# Patient Record
Sex: FEMALE | Race: WHITE | NOT HISPANIC OR LATINO
[De-identification: names, ages, dates, MRNs, and addresses within clinical notes are randomized per-mention and may not be internally consistent; named-entity substitution may affect disease eponyms.]

---

## 2021-10-27 PROBLEM — Z00.00 ENCOUNTER FOR PREVENTIVE HEALTH EXAMINATION: Status: ACTIVE | Noted: 2021-10-27

## 2021-10-29 PROBLEM — Z78.9 NEVER SMOKED CIGARETTES: Status: ACTIVE | Noted: 2021-10-29

## 2021-10-29 PROBLEM — Z86.39 HISTORY OF HYPOTHYROIDISM: Status: RESOLVED | Noted: 2021-10-29 | Resolved: 2021-10-29

## 2021-10-29 RX ORDER — ROSUVASTATIN CALCIUM 5 MG/1
5 TABLET, FILM COATED ORAL
Refills: 0 | Status: ACTIVE | COMMUNITY

## 2021-10-29 RX ORDER — MIRABEGRON 25 MG/1
25 TABLET, FILM COATED, EXTENDED RELEASE ORAL
Refills: 0 | Status: ACTIVE | COMMUNITY

## 2021-10-29 RX ORDER — LEVOTHYROXINE SODIUM 137 UG/1
TABLET ORAL
Refills: 0 | Status: ACTIVE | COMMUNITY

## 2021-11-02 ENCOUNTER — APPOINTMENT (OUTPATIENT)
Dept: RADIATION ONCOLOGY | Facility: CLINIC | Age: 71
End: 2021-11-02
Payer: MEDICARE

## 2021-11-02 VITALS
SYSTOLIC BLOOD PRESSURE: 131 MMHG | WEIGHT: 197 LBS | DIASTOLIC BLOOD PRESSURE: 72 MMHG | TEMPERATURE: 98 F | BODY MASS INDEX: 34.91 KG/M2 | RESPIRATION RATE: 16 BRPM | OXYGEN SATURATION: 97 % | HEIGHT: 63 IN | HEART RATE: 82 BPM

## 2021-11-02 DIAGNOSIS — Z80.0 FAMILY HISTORY OF MALIGNANT NEOPLASM OF DIGESTIVE ORGANS: ICD-10-CM

## 2021-11-02 DIAGNOSIS — Z78.9 OTHER SPECIFIED HEALTH STATUS: ICD-10-CM

## 2021-11-02 DIAGNOSIS — Z86.39 PERSONAL HISTORY OF OTHER ENDOCRINE, NUTRITIONAL AND METABOLIC DISEASE: ICD-10-CM

## 2021-11-02 PROCEDURE — 99204 OFFICE O/P NEW MOD 45 MIN: CPT | Mod: 25

## 2021-11-02 RX ORDER — METRONIDAZOLE 10 MG/G
GEL TOPICAL
Refills: 0 | Status: DISCONTINUED | COMMUNITY
End: 2021-11-02

## 2021-11-02 NOTE — REVIEW OF SYSTEMS
[Fatigue: Grade 1 - Fatigue relieved by rest] : Fatigue: Grade 1 - Fatigue relieved by rest [Breast Pain: Grade 0] : Breast Pain: Grade 0 [Insomnia: Grade 1 - Mild difficulty falling asleep, staying asleep or waking up early] : Insomnia: Grade 1 - Mild difficulty falling asleep, staying asleep or waking up early

## 2021-11-02 NOTE — PHYSICAL EXAM
[Breast Appearance] : normal in appearance [Symmetric] : breasts are symmetric [Breast Palpation Mass] : no palpable masses [Breast Abnormal Lactation (Galactorrhea)] : no nipple discharge [No UE Edema] : there is no upper extremity edema [Normal] : oriented to person, place and time, the affect was normal, the mood was normal and not anxious [de-identified] : Well-healed lumpectomy scar on left side with no underlying induration.  No other lumps palpable.  No lymph nodes palpable in left axilla

## 2021-11-02 NOTE — OB/GYN HISTORY
[History of Birth Control Pills] : Patient has a history of taking birth control pills [___] : Total Pregnancies: [unfilled] [History of Hormone Replacement Therapy] : no history of hormone replacement therapy

## 2021-11-16 ENCOUNTER — NON-APPOINTMENT (OUTPATIENT)
Age: 71
End: 2021-11-16

## 2021-11-16 VITALS
SYSTOLIC BLOOD PRESSURE: 97 MMHG | HEART RATE: 70 BPM | TEMPERATURE: 98.1 F | HEIGHT: 63 IN | RESPIRATION RATE: 16 BRPM | BODY MASS INDEX: 34.91 KG/M2 | WEIGHT: 197 LBS | DIASTOLIC BLOOD PRESSURE: 65 MMHG | OXYGEN SATURATION: 98 %

## 2021-11-16 NOTE — HISTORY OF PRESENT ILLNESS
[FreeTextEntry1] : Ms. Valerio is a 70 year old female referred here by Dr. Aguilar. \par \par Dr. Bernabe at [facility] palpated a mass on a regular GYN visit and ordered imaging. \par \par On 6/22/21 she had a bilateral mammogram and ultrasound at [facility] which revealed architectural distortion and vague shadowing in the left breast 2-3:00 region. \par \par On 7/14/21 she had an ultrasound guided biopsy at [facility] revealing invasive mammary carcinoma, with ductal and lobular features, ER 90%, ID 50%, HER-2 negative. \par \par On 7/30/21 she had an MRI at [facility] revealing spiculated enhancing left breast mass at 2:00, indeterminate enlarged left axillary lymph node. No suspicious enhancement in the right breast. \par \par On 9/1/21 she had a left lumpectomy and sentinel node biopsy, tumor site 2:00, histological  grade 6/9,  revealing an overall  grade 2/3, tumor size 20mm, invasive ductal carcinoma, margins negative, narrowest surgical margins- 3mm from posterior surface, 7mm from anterior surface, and 9mm from interior surface.  3 left axillary sentinel lymph nodes negative, no lymphovascular invasion present. ER99%, ID 50%, HER-2 zero, Ki-67 30%. \par \par On 9/23/21 Oncotype was 21.\par \par She met with Dr. Owens on 10/25/21 who discussed aromatase inhibitors post radiation, and a bone density test. \par \par She denies any pain, and is healing well post surgery. She report insomnia which is not new. \par \par 11/16/21\par She is here for an OTV, she has completed 1/16 fractions for a total of 265 cGy to the left breast. Skin care was reiterated. She has no complaints at this time. \par \par

## 2021-11-16 NOTE — DISEASE MANAGEMENT
[Pathological] : TNM Stage: p [I] : I [TTNM] : 1 [NTNM] : 0 [MTNM] : 0 [de-identified] : Patient completed 1/16 fractions for a total of 265 cGy to the left breast

## 2021-11-16 NOTE — PHYSICAL EXAM
[Normal] : oriented to person, place and time, the affect was normal, the mood was normal and not anxious [de-identified] : No erythema seen

## 2021-11-16 NOTE — REVIEW OF SYSTEMS
[Fatigue: Grade 1 - Fatigue relieved by rest] : Fatigue: Grade 1 - Fatigue relieved by rest [Breast Pain: Grade 0] : Breast Pain: Grade 0 [Insomnia: Grade 1 - Mild difficulty falling asleep, staying asleep or waking up early] : Insomnia: Grade 1 - Mild difficulty falling asleep, staying asleep or waking up early [Alopecia: Grade 0] : Alopecia: Grade 0 [Pruritus: Grade 0] : Pruritus: Grade 0 [Skin Atrophy: Grade 0] : Skin Atrophy: Grade 0 [Skin Hyperpigmentation: Grade 0] : Skin Hyperpigmentation: Grade 0 [Skin Induration: Grade 0] : Skin Induration: Grade 0 [Dermatitis Radiation: Grade 0] : Dermatitis Radiation: Grade 0

## 2021-11-23 ENCOUNTER — NON-APPOINTMENT (OUTPATIENT)
Age: 71
End: 2021-11-23

## 2021-11-23 VITALS
TEMPERATURE: 98.1 F | SYSTOLIC BLOOD PRESSURE: 122 MMHG | HEART RATE: 73 BPM | OXYGEN SATURATION: 97 % | RESPIRATION RATE: 16 BRPM | BODY MASS INDEX: 35.07 KG/M2 | WEIGHT: 198 LBS | DIASTOLIC BLOOD PRESSURE: 76 MMHG

## 2021-11-23 NOTE — HISTORY OF PRESENT ILLNESS
[FreeTextEntry1] : Ms. Valerio is a 70 year old female referred here by Dr. Aguilar. \par \par Dr. Bernabe at [facility] palpated a mass on a regular GYN visit and ordered imaging. \par \par On 6/22/21 she had a bilateral mammogram and ultrasound at [facility] which revealed architectural distortion and vague shadowing in the left breast 2-3:00 region. \par \par On 7/14/21 she had an ultrasound guided biopsy at [facility] revealing invasive mammary carcinoma, with ductal and lobular features, ER 90%, AL 50%, HER-2 negative. \par \par \par \par On 7/30/21 she had an MRI at [facility] revealing spiculated enhancing left breast mass at 2:00, indeterminate enlarged left axillary lymph node. No suspicious enhancement in the right breast. \par \par On 9/1/21 she had a left lumpectomy and sentinel node biopsy, tumor site 2:00, histological  grade 6/9,  revealing an overall  grade 2/3, tumor size 20mm, invasive ductal carcinoma, margins negative, narrowest surgical margins- 3mm from posterior surface, 7mm from anterior surface, and 9mm from interior surface.  3 left axillary sentinel lymph nodes negative, no lymphovascular invasion present. ER99%, AL 50%, HER-2 zero, Ki-67 30%. \par \par On 9/23/21 Oncotype was 21.\par \par She met with Dr. Owens on 10/25/21 who discussed aromatase inhibitors post radiation, and a bone density test. \par \par She denies any pain, and is healing well post surgery. She report insomnia which is not new. \par \par 11/16/21\par She is here for an OTV, she has completed 1/16 fractions for a total of 265 cGy to the left breast. Skin care was reiterated. She has no complaints at this time. \par \par 11/23/21\par Ms. Valerio presents for OTV. She had completed 7/16 fractions and total of 1,855 cGy left breast. She has no pain and is using calendula for her skin.

## 2021-11-23 NOTE — PHYSICAL EXAM
[Normal] : oriented to person, place and time, the affect was normal, the mood was normal and not anxious [de-identified] : Mild erythema of the breast.  No desquamation

## 2021-11-23 NOTE — DISEASE MANAGEMENT
[Pathological] : TNM Stage: p [I] : I [TTNM] : 1 [NTNM] : 0 [MTNM] : 0 [de-identified] : Patient completed 7/16 fractions for a total of 1855 cGy to the left breast

## 2021-11-29 ENCOUNTER — NON-APPOINTMENT (OUTPATIENT)
Age: 71
End: 2021-11-29

## 2021-11-29 VITALS
OXYGEN SATURATION: 98 % | SYSTOLIC BLOOD PRESSURE: 124 MMHG | TEMPERATURE: 98 F | DIASTOLIC BLOOD PRESSURE: 82 MMHG | RESPIRATION RATE: 16 BRPM | HEART RATE: 76 BPM

## 2021-11-29 NOTE — DISEASE MANAGEMENT
[Pathological] : TNM Stage: p [I] : I [TTNM] : 1 [NTNM] : 0 [MTNM] : 0 [de-identified] : Patient completed 9/16 fractions for a total of 2385 cGy to the left breast

## 2021-11-29 NOTE — PHYSICAL EXAM
[Normal] : well developed, well nourished, in no acute distress [de-identified] : Mild erythema left breast; skin is intact

## 2021-11-29 NOTE — HISTORY OF PRESENT ILLNESS
[FreeTextEntry1] : Ms. Valerio is a 70 year old female referred here by Dr. Aguilar. \par \par Dr. Bernabe at [facility] palpated a mass on a regular GYN visit and ordered imaging. \par \par On 6/22/21 she had a bilateral mammogram and ultrasound at [facility] which revealed architectural distortion and vague shadowing in the left breast 2-3:00 region. \par \par On 7/14/21 she had an ultrasound guided biopsy at [facility] revealing invasive mammary carcinoma, with ductal and lobular features, ER 90%, LA 50%, HER-2 negative. \par \par \par \par On 7/30/21 she had an MRI at [facility] revealing spiculated enhancing left breast mass at 2:00, indeterminate enlarged left axillary lymph node. No suspicious enhancement in the right breast. \par \par On 9/1/21 she had a left lumpectomy and sentinel node biopsy, tumor site 2:00, histological  grade 6/9,  revealing an overall  grade 2/3, tumor size 20mm, invasive ductal carcinoma, margins negative, narrowest surgical margins- 3mm from posterior surface, 7mm from anterior surface, and 9mm from interior surface.  3 left axillary sentinel lymph nodes negative, no lymphovascular invasion present. ER99%, LA 50%, HER-2 zero, Ki-67 30%. \par \par On 9/23/21 Oncotype was 21.\par \par She met with Dr. Owens on 10/25/21 who discussed aromatase inhibitors post radiation, and a bone density test. \par \par She denies any pain, and is healing well post surgery. She report insomnia which is not new. \par \par 11/16/21\par She is here for an OTV, she has completed 1/16 fractions for a total of 265 cGy to the left breast. Skin care was reiterated. She has no complaints at this time. \par \par 11/23/21\par Ms. Valerio presents for OTV. She had completed 7/16 fractions and total of 1,855 cGy left breast. She has no pain and is using calendula for her skin.

## 2021-12-07 ENCOUNTER — NON-APPOINTMENT (OUTPATIENT)
Age: 71
End: 2021-12-07

## 2021-12-07 VITALS
HEIGHT: 63 IN | OXYGEN SATURATION: 98 % | TEMPERATURE: 98 F | BODY MASS INDEX: 35.08 KG/M2 | WEIGHT: 198 LBS | SYSTOLIC BLOOD PRESSURE: 130 MMHG | DIASTOLIC BLOOD PRESSURE: 65 MMHG | RESPIRATION RATE: 12 BRPM | HEART RATE: 68 BPM

## 2021-12-07 NOTE — PHYSICAL EXAM
[Normal] : oriented to person, place and time, the affect was normal, the mood was normal and not anxious [de-identified] : Moderate erythema of the breast.  No desquamation

## 2021-12-07 NOTE — DISEASE MANAGEMENT
[Pathological] : TNM Stage: p [I] : I [TTNM] : 1 [NTNM] : 0 [MTNM] : 0 [de-identified] : Patient completed 15 /16 fractions for a total of 3975 cGy to the left breast

## 2021-12-07 NOTE — HISTORY OF PRESENT ILLNESS
[FreeTextEntry1] : Ms. Valerio is a 70 year old female referred here by Dr. Aguilar. \par \par Dr. Bernabe at [facility] palpated a mass on a regular GYN visit and ordered imaging. \par \par On 6/22/21 she had a bilateral mammogram and ultrasound at [facility] which revealed architectural distortion and vague shadowing in the left breast 2-3:00 region. \par \par On 7/14/21 she had an ultrasound guided biopsy at [facility] revealing invasive mammary carcinoma, with ductal and lobular features, ER 90%, OK 50%, HER-2 negative. \par \par \par \par On 7/30/21 she had an MRI at [facility] revealing spiculated enhancing left breast mass at 2:00, indeterminate enlarged left axillary lymph node. No suspicious enhancement in the right breast. \par \par On 9/1/21 she had a left lumpectomy and sentinel node biopsy, tumor site 2:00, histological  grade 6/9,  revealing an overall  grade 2/3, tumor size 20mm, invasive ductal carcinoma, margins negative, narrowest surgical margins- 3mm from posterior surface, 7mm from anterior surface, and 9mm from interior surface.  3 left axillary sentinel lymph nodes negative, no lymphovascular invasion present. ER99%, OK 50%, HER-2 zero, Ki-67 30%. \par \par On 9/23/21 Oncotype was 21.\par \par She met with Dr. Owens on 10/25/21 who discussed aromatase inhibitors post radiation, and a bone density test. \par \par She denies any pain, and is healing well post surgery. She report insomnia which is not new. \par \par \par 12/7/21\par Ms. Valerio presents today for OTV. She completed 15 /16 fractions for a total of 3975 cGy to the left breast. She is overall feeling well. She is using Calendula cream BID. Skin has a slight pink hyperpigmentation, and skin is intact. She denies any itching. She does use Calendula cream BID. She does notice an increase in fatigue.

## 2021-12-07 NOTE — VITALS
[Maximal Pain Intensity: 0/10] : 0/10 [Least Pain Intensity: 0/10] : 0/10 [90: Able to carry normal activity; minor signs or symptoms of disease.] : 90: Able to carry normal activity; minor signs or symptoms of disease.  [100: Fully active, normal.] : 100: Fully active, normal. [ECOG Performance Status: 0 - Fully active, able to carry on all pre-disease performance without restriction] : Performance Status: 0 - Fully active, able to carry on all pre-disease performance without restriction [Date: ____________] : Patient's last distress assessment performed on [unfilled]. [0 - No Distress] : Distress Level: 0

## 2021-12-14 ENCOUNTER — NON-APPOINTMENT (OUTPATIENT)
Age: 71
End: 2021-12-14

## 2021-12-14 VITALS
SYSTOLIC BLOOD PRESSURE: 128 MMHG | HEIGHT: 63 IN | RESPIRATION RATE: 14 BRPM | TEMPERATURE: 98 F | HEART RATE: 72 BPM | OXYGEN SATURATION: 98 % | WEIGHT: 198 LBS | BODY MASS INDEX: 35.08 KG/M2 | DIASTOLIC BLOOD PRESSURE: 76 MMHG

## 2021-12-14 NOTE — DISEASE MANAGEMENT
[Pathological] : TNM Stage: p [I] : I [TTNM] : 1 [NTNM] : 0 [MTNM] : 0 [de-identified] : Patient completed 16 /16 fractions for a total of 4240 cGy and 4/4 boost to a dose of 1000 cGy to the left breast on 12/14/2021

## 2021-12-14 NOTE — HISTORY OF PRESENT ILLNESS
[FreeTextEntry1] : Ms. Valerio is a 70 year old female referred here by Dr. Aguilar. \par \par Dr. Bernabe at [facility] palpated a mass on a regular GYN visit and ordered imaging. \par \par On 6/22/21 she had a bilateral mammogram and ultrasound at [facility] which revealed architectural distortion and vague shadowing in the left breast 2-3:00 region. \par \par On 7/14/21 she had an ultrasound guided biopsy at [facility] revealing invasive mammary carcinoma, with ductal and lobular features, ER 90%, MS 50%, HER-2 negative. \par \par \par \par On 7/30/21 she had an MRI at [facility] revealing spiculated enhancing left breast mass at 2:00, indeterminate enlarged left axillary lymph node. No suspicious enhancement in the right breast. \par \par On 9/1/21 she had a left lumpectomy and sentinel node biopsy, tumor site 2:00, histological  grade 6/9,  revealing an overall  grade 2/3, tumor size 20mm, invasive ductal carcinoma, margins negative, narrowest surgical margins- 3mm from posterior surface, 7mm from anterior surface, and 9mm from interior surface.  3 left axillary sentinel lymph nodes negative, no lymphovascular invasion present. ER99%, MS 50%, HER-2 zero, Ki-67 30%. \par \par On 9/23/21 Oncotype was 21.\par \par She met with Dr. Owens on 10/25/21 who discussed aromatase inhibitors post radiation, and a bone density test. \par \par She denies any pain, and is healing well post surgery. She report insomnia which is not new. \par \par \par 12/14/21\par Ms. Valerio presents today for OTV. She completed 16 /16 fractions for a total of 4240 cGy and 4/4 boost to a dose of 1000 cGy to the left breast. She is overall feeling well. She is using Calendula cream BID. Skin has a slight pink hyperpigmentation, and skin is intact. She denies any itching. She has developed a slight rash on the CW and we recommending adding  Aquaphor. She has increasing fatigue. She has noticed that her left shoulder has been making clicking noise in certain movements. It is sore and that she does feel pain when she moves her arm around. We recommend PT for the shoulder. She is making an appointment with Dr. Owens in the next week. She will f/u with us in 1 months time and is encouraged to call with any complaints or increased skin issues.

## 2021-12-14 NOTE — REVIEW OF SYSTEMS
[Fatigue: Grade 1 - Fatigue relieved by rest] : Fatigue: Grade 1 - Fatigue relieved by rest [Breast Pain: Grade 0] : Breast Pain: Grade 0 [Insomnia: Grade 1 - Mild difficulty falling asleep, staying asleep or waking up early] : Insomnia: Grade 1 - Mild difficulty falling asleep, staying asleep or waking up early [Alopecia: Grade 0] : Alopecia: Grade 0 [Pruritus: Grade 0] : Pruritus: Grade 0 [Skin Atrophy: Grade 0] : Skin Atrophy: Grade 0 [Skin Hyperpigmentation: Grade 1 - Hyperpigmentation covering <10% BSA; no psychosocial impact] : Skin Hyperpigmentation: Grade 1 - Hyperpigmentation covering <10% BSA; no psychosocial impact [Skin Induration: Grade 0] : Skin Induration: Grade 0 [Dermatitis Radiation: Grade 1 - Faint erythema or dry desquamation] : Dermatitis Radiation: Grade 1 - Faint erythema or dry desquamation

## 2021-12-14 NOTE — PHYSICAL EXAM
[Normal] : oriented to person, place and time, the affect was normal, the mood was normal and not anxious [de-identified] : Moderate erythema of the breast.  No desquamation

## 2022-01-14 ENCOUNTER — APPOINTMENT (OUTPATIENT)
Dept: RADIATION ONCOLOGY | Facility: CLINIC | Age: 72
End: 2022-01-14
Payer: MEDICARE

## 2022-01-14 VITALS
HEART RATE: 71 BPM | WEIGHT: 198 LBS | TEMPERATURE: 98 F | BODY MASS INDEX: 35.08 KG/M2 | RESPIRATION RATE: 16 BRPM | SYSTOLIC BLOOD PRESSURE: 122 MMHG | HEIGHT: 63 IN | OXYGEN SATURATION: 99 % | DIASTOLIC BLOOD PRESSURE: 74 MMHG

## 2022-01-14 DIAGNOSIS — Z17.0 MALIGNANT NEOPLASM OF UPPER-OUTER QUADRANT OF LEFT FEMALE BREAST: ICD-10-CM

## 2022-01-14 DIAGNOSIS — C50.412 MALIGNANT NEOPLASM OF UPPER-OUTER QUADRANT OF LEFT FEMALE BREAST: ICD-10-CM

## 2022-01-14 PROCEDURE — 99024 POSTOP FOLLOW-UP VISIT: CPT

## 2022-01-14 NOTE — HISTORY OF PRESENT ILLNESS
[FreeTextEntry1] : Ms. Valerio is a 70 year old female referred here by Dr. Aguilar. \par \par Dr. Bernabe at [facility] palpated a mass on a regular GYN visit and ordered imaging. \par \par On 6/22/21 she had a bilateral mammogram and ultrasound at [facility] which revealed architectural distortion and vague shadowing in the left breast 2-3:00 region. \par \par On 7/14/21 she had an ultrasound guided biopsy at [facility] revealing invasive mammary carcinoma, with ductal and lobular features, ER 90%, GA 50%, HER-2 negative. \par \par On 7/30/21 she had an MRI at [facility] revealing spiculated enhancing left breast mass at 2:00, indeterminate enlarged left axillary lymph node. No suspicious enhancement in the right breast. \par \par On 9/1/21 she had a left lumpectomy and sentinel node biopsy, tumor site 2:00, histological  grade 6/9,  revealing an overall  grade 2/3, tumor size 20mm, invasive ductal carcinoma, margins negative, narrowest surgical margins- 3mm from posterior surface, 7mm from anterior surface, and 9mm from interior surface.  3 left axillary sentinel lymph nodes negative, no lymphovascular invasion present. ER99%, GA 50%, HER-2 zero, Ki-67 30%. \par \par On 9/23/21 Oncotype was 21.\par \par She met with Dr. Owens on 10/25/21 who discussed aromatase inhibitors post radiation, and a bone density test. \par \par She denies any pain, and is healing well post surgery. She report insomnia which is not new. \par \par 1/14/22\par Ms. Valerio presents today for PTE. She completed 16 /16 fractions for a total of 4240 cGy and 4/4 boost to a dose of 1000 cGy to the left breast, completed on 12/14/22.  She is seeing Dr. Owens. \par She is overall feeling well. She states that her fatigue is improving. She denies any pain, breast discharge. Her skin is healed and has been using Aquaphor spray. She has good ROM. She has not seen Dr. Owens but will f/u so that she can start her AI.

## 2022-01-14 NOTE — DISEASE MANAGEMENT
[Pathological] : TNM Stage: p [I] : I [TTNM] : 1 [NTNM] : 0 [MTNM] : 0 [de-identified] : Patient completed 16 /16 fractions for a total of 4240 cGy and 4/4 boost to a dose of 1000 cGy to the left breast on 12/14/2021

## 2022-01-14 NOTE — PHYSICAL EXAM
[Breast Appearance] : normal in appearance [Symmetric] : breasts are symmetric [Breast Palpation Mass] : no palpable masses [Breast Abnormal Lactation (Galactorrhea)] : no nipple discharge [No UE Edema] : there is no upper extremity edema [Normal] : oriented to person, place and time, the affect was normal, the mood was normal and not anxious [de-identified] : Very minimal residual erythema of left breast.  No desquamation noted.  No telangiectasias seen

## 2022-01-14 NOTE — REVIEW OF SYSTEMS
[Fatigue: Grade 1 - Fatigue relieved by rest] : Fatigue: Grade 1 - Fatigue relieved by rest [Breast Pain: Grade 0] : Breast Pain: Grade 0 [Pruritus: Grade 0] : Pruritus: Grade 0 [Skin Hyperpigmentation: Grade 0] : Skin Hyperpigmentation: Grade 0 [Dermatitis Radiation: Grade 0] : Dermatitis Radiation: Grade 0 [FreeTextEntry3] : improving

## 2022-03-29 ENCOUNTER — TRANSCRIPTION ENCOUNTER (OUTPATIENT)
Age: 72
End: 2022-03-29

## 2022-04-28 ENCOUNTER — APPOINTMENT (OUTPATIENT)
Dept: GYNECOLOGIC ONCOLOGY | Facility: CLINIC | Age: 72
End: 2022-04-28
Payer: MEDICARE

## 2022-04-28 VITALS
HEART RATE: 71 BPM | BODY MASS INDEX: 35.08 KG/M2 | SYSTOLIC BLOOD PRESSURE: 122 MMHG | DIASTOLIC BLOOD PRESSURE: 70 MMHG | WEIGHT: 198 LBS | HEIGHT: 63 IN | OXYGEN SATURATION: 97 %

## 2022-04-28 DIAGNOSIS — C54.1 MALIGNANT NEOPLASM OF ENDOMETRIUM: ICD-10-CM

## 2022-04-28 PROCEDURE — 99204 OFFICE O/P NEW MOD 45 MIN: CPT

## 2022-04-28 RX ORDER — ANASTROZOLE TABLETS 1 MG/1
TABLET ORAL
Refills: 0 | Status: ACTIVE | COMMUNITY

## 2022-04-30 NOTE — HISTORY OF PRESENT ILLNESS
[FreeTextEntry1] : 72yo P0 w/ incidental diagnosis of stage 1A FIGO grade 1 endometrial cancer at time of prolapse surgery. 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 MS Stable, Path with NO high risk features\par Tx: robotic tlh/bso/USLS/APER by Dr. Conway (urogyn) on 3/28/22\par Adj tx: none\par \par Pmhx: breast cancer currently on anastrazole\par Surghx: robotic tlh/bso/usls/aper 3/2022\par OBGYNhx: nullip. ocps for years. no hx abnl pap/sts/cyts. + hx fibroids\par Famhx: father pancreatic ca\par Sochx: no smoking/etoh/drugs\par \par Mammogram:  up to date\par Colonoscopy: up to date\par \par Patient reports doing well since her surgery. She denies pain/fever/bleeding/bloating. She has normal activity tolerance and normal appetite. Reports normal urination and BMs. She is recovering well from surgery without any issues.\par \par Pre-op US: 11/20/21\par Uterus 7x3cm, calcified 2cm fibroid, EE not well visualized 2' to fibroid but appears to measure 3mm, ovaries not visualized, no adnexal masses, no FF\par \par \par Final path 3/28/22: uterus, cervix, tubes and ovaries = Stage 1A grade 1 endometrioid endometrial adenoca,  neg DOI, neg LVSI, neg cervix, neg LEONIDES, LNs not evaluated. MMR IHC intact\par \par

## 2022-04-30 NOTE — LETTER BODY
[Dear  ___] : Dear  [unfilled], [FreeTextEntry2] : \par Thank you for referring ANNAMARIA MENDEZ to [facility]. I had the pleasure of meeting with Ms. ANNAMARIA MENDEZ on 4/28/22. Please find my consultation note attached. Thank you for your trust and confidence in me and our practice, it means a great deal to us. Please feel free to contact me at anytime.\par Sincerely, \par \par Dr. Mariel Celaya\par Office: [phone]\par Fax: [phone]\par

## 2022-04-30 NOTE — PHYSICAL EXAM
[Chaperone Present] : A chaperone was present in the examining room during all aspects of the physical examination [Normal] : Orientation to person, place, and time: Normal [Fully active, able to carry on all pre-disease performance without restriction] : Status 0 - Fully active, able to carry on all pre-disease performance without restriction [de-identified] : soft, healing incisions [de-identified] : Pelvic exam Deferred to next visit

## 2022-04-30 NOTE — DISCUSSION/SUMMARY
[Reviewed Clinical Lab Test(s)] : Results of clinical tests were reviewed. [Reviewed Radiology Report(s)] : Radiology reports were reviewed. [Visit Time ___ Minutes] : [unfilled] minutes [Face to Face Time___ Minutes] : with [unfilled] minutes in face to face consultation. [Discuss Alternatives/Risks/Benefits w/Patient] : All alternatives, risks, and benefits were discussed with the patient/family and all questions were answered.  Patient expressed good understanding and appreciates the importance of follow up as recommended. [FreeTextEntry1] : 70yo P0 w/ at least stage 1A grade 1 endometrioid endometrial cancer incidental finding s/p definitive surgical mgmt of pelvic floor prolapse. Path with NO high risk features. No indication for adjuvant therapy. Recovering well from surgery\par -more than 50% of visit spent face to face with patient reviewing records and interpreting imaging/path/lab results, counseling and coordinating care. I reviewed the diagnosis of stage 1 endometrial cancer and the standard of care approach to diagnosis and surgical mgmt with hyst/bso/slnbx. I explained to patient the high risk pathology features that determine need for adjuvant therapy. I explained that in her case she has <5% risk for pelvic lymph node involvement and guidelines recommend SLNbx at time of surgery when endometrial cancer diagnosis is known. I explained that since her diagnosis was an incidental finding, a low grade and a low stage, repeat surgery for a full lymph node dissection is not indicated. I explained that SLNbx cannot be done now that the uterus and cervix are already removed and full lymph node dissection has higher risk of morbidity (approx 10% for lymph edema) when compared to the risk that a positive lymph node would be found. I explained that I would recommend surveillance at this point with physical exams and ROS. I explained that imaging is only indicated if new symptoms or exam findings. I reviewed the si/sxs of recurrent disease. all questions answered and patient expressed understanding.\par -for adequate initiation of surveillance, patient will need pelvic exam and plan is to do this at next visit since she is only 4wks post-op.\par -rtc 3 months for pelvic exam and then plan for q6 month surveillance x1yr and then annually for 5yrs\par -pain/fever/bleeding precautions given\par

## 2022-09-14 ENCOUNTER — APPOINTMENT (OUTPATIENT)
Dept: GYNECOLOGIC ONCOLOGY | Facility: CLINIC | Age: 72
End: 2022-09-14